# Patient Record
Sex: MALE | Race: ASIAN | NOT HISPANIC OR LATINO | Employment: FULL TIME | URBAN - METROPOLITAN AREA
[De-identification: names, ages, dates, MRNs, and addresses within clinical notes are randomized per-mention and may not be internally consistent; named-entity substitution may affect disease eponyms.]

---

## 2020-10-22 ENCOUNTER — AMBULATORY - HEALTHEAST (OUTPATIENT)
Dept: NURSING | Facility: CLINIC | Age: 30
End: 2020-10-22

## 2021-06-01 ENCOUNTER — RECORDS - HEALTHEAST (OUTPATIENT)
Dept: ADMINISTRATIVE | Facility: CLINIC | Age: 31
End: 2021-06-01

## 2022-05-22 ENCOUNTER — APPOINTMENT (OUTPATIENT)
Dept: GENERAL RADIOLOGY | Facility: CLINIC | Age: 32
End: 2022-05-22
Attending: PHYSICIAN ASSISTANT
Payer: COMMERCIAL

## 2022-05-22 ENCOUNTER — HOSPITAL ENCOUNTER (EMERGENCY)
Facility: CLINIC | Age: 32
Discharge: HOME | End: 2022-05-22
Attending: PHYSICIAN ASSISTANT | Admitting: PHYSICIAN ASSISTANT
Payer: COMMERCIAL

## 2022-05-22 VITALS
WEIGHT: 200 LBS | TEMPERATURE: 97.9 F | HEART RATE: 77 BPM | OXYGEN SATURATION: 95 % | DIASTOLIC BLOOD PRESSURE: 66 MMHG | SYSTOLIC BLOOD PRESSURE: 124 MMHG | RESPIRATION RATE: 20 BRPM | BODY MASS INDEX: 30.31 KG/M2 | HEIGHT: 68 IN

## 2022-05-22 DIAGNOSIS — M25.562 LEFT KNEE PAIN: ICD-10-CM

## 2022-05-22 DIAGNOSIS — S89.92XA KNEE INJURY, LEFT, INITIAL ENCOUNTER: ICD-10-CM

## 2022-05-22 PROCEDURE — 99203 OFFICE O/P NEW LOW 30 MIN: CPT | Performed by: PHYSICIAN ASSISTANT

## 2022-05-22 PROCEDURE — 73560 X-RAY EXAM OF KNEE 1 OR 2: CPT | Mod: LT

## 2022-05-22 PROCEDURE — G0463 HOSPITAL OUTPT CLINIC VISIT: HCPCS | Performed by: PHYSICIAN ASSISTANT

## 2022-05-22 NOTE — ED PROVIDER NOTES
"  History     Chief Complaint   Patient presents with     Fall     Patient was playing soccer yesterday and collided with another person, Swelling and pain today unable to bear full weight     HPI  Lizzeth Pitt is a 32 year old male who presents with complaints of left knee pain and swelling since yesterday.  Patient states he was playing soccer when he accidentally collided with another person.  He states they were both kicking the ball at the same time and patient's knee was hit and medially.  He has had medial knee pain since that time.  Patient describes difficulties weightbearing due to the pain.  He denies a sensation of his knee giving out on him.  No other injuries reported.      Allergies:  No Known Allergies    Problem List:    There are no problems to display for this patient.       Past Medical History:    History reviewed. No pertinent past medical history.    Past Surgical History:    History reviewed. No pertinent surgical history.    Family History:    History reviewed. No pertinent family history.    Social History:  Marital Status:  Single [1]  Social History     Tobacco Use     Smoking status: Never Smoker     Smokeless tobacco: Never Used        Medications:    No current outpatient medications on file.        Review of Systems   Gastrointestinal:        Left knee pain   Skin: Negative.    All other systems reviewed and are negative.      Physical Exam   BP: 124/66  Pulse: 77  Temp: 97.9  F (36.6  C)  Resp: 20  Height: 172.7 cm (5' 8\")  Weight: 90.7 kg (200 lb)  SpO2: 95 %      Physical Exam  Constitutional:       General: He is not in acute distress.     Appearance: He is well-developed. He is not ill-appearing, toxic-appearing or diaphoretic.   HENT:      Head: Normocephalic and atraumatic.   Eyes:      Conjunctiva/sclera: Conjunctivae normal.   Cardiovascular:      Pulses: Normal pulses.   Pulmonary:      Effort: Pulmonary effort is normal.   Musculoskeletal:      Cervical back: Neck supple.      " Left upper leg: Normal.      Left knee: No swelling or crepitus. Decreased range of motion. Tenderness present over the medial joint line and MCL. Normal alignment, normal meniscus and normal patellar mobility.      Left lower leg: Normal.      Left ankle: Normal.   Skin:     General: Skin is warm and dry.      Capillary Refill: Capillary refill takes less than 2 seconds.   Neurological:      General: No focal deficit present.      Mental Status: He is alert.      Sensory: Sensation is intact.      Motor: Motor function is intact.   Psychiatric:         Mood and Affect: Mood normal.         ED Course                 Procedures    Results for orders placed or performed during the hospital encounter of 05/22/22 (from the past 24 hour(s))   XR Knee Left 1/2 Views    Narrative    EXAM: XR KNEE LT 1/2 VW  LOCATION: [facility]  DATE/TIME: 5/22/2022 1:19 PM    INDICATION: soccer injury, tenderness to medial joint space  COMPARISON: None.      Impression    IMPRESSION: Normal joint spaces and alignment. No fracture or joint effusion.       Medications - No data to display    Assessments & Plan (with Medical Decision Making)     Pt is a 32 year old male who presents with complaints of left knee pain and swelling since yesterday.  Patient states he was playing soccer when he accidentally collided with another person.  He states they were both kicking the ball at the same time and patient's knee was hit and medially.  He has had medial knee pain since that time.  Patient describes difficulties weightbearing due to the pain.  He denies a sensation of his knee giving out on him.     Pt is afebrile on arrival.  Exam as above.  X-rays of left knee were negative for joint effusion, fracture, or malalignment.  Discussed results with patient.  We discussed possibility of meniscal or ligamentous injury.  Encouraged symptomatic treatments at home.  Return precautions were reviewed.  Hand-outs were  provided.    Patient was instructed to follow-up with orthopedics for continued care and management or sooner if new or worsening symptoms.  She is to return to the ED for persistent and/or worsening symptoms.  Patient expressed understanding of the diagnosis and plan and was discharged home in good condition.    I have reviewed the nursing notes.    I have reviewed the findings, diagnosis, plan and need for follow up with the patient.    There are no discharge medications for this patient.      Final diagnoses:   Left knee pain   Knee injury, left, initial encounter       5/22/2022   [facility] EMERGENCY DEPT      Disclaimer:  This note consists of symbols derived from keyboarding, dictation and/or voice recognition software.  As a result, there may be errors in the script that have gone undetected.  Please consider this when interpreting information found in this chart.     Josselyn Lozano PA-C  05/23/22 1059

## 2022-05-22 NOTE — ED TRIAGE NOTES
Patient was playing soccer yesterday and collided with another person, Swelling and pain today unable to bear full weight     Triage Assessment     Row Name 05/22/22 1217       Triage Assessment (Adult)    Airway WDL WDL       Respiratory WDL    Respiratory WDL WDL       Skin Circulation/Temperature WDL    Skin Circulation/Temperature WDL WDL       Cardiac WDL    Cardiac WDL WDL       Peripheral/Neurovascular WDL    Peripheral Neurovascular WDL WDL       Cognitive/Neuro/Behavioral WDL    Cognitive/Neuro/Behavioral WDL WDL

## 2022-05-23 ASSESSMENT — ENCOUNTER SYMPTOMS: ROS GI COMMENTS: LEFT KNEE PAIN

## 2023-07-09 ENCOUNTER — OFFICE VISIT (OUTPATIENT)
Dept: FAMILY MEDICINE | Facility: CLINIC | Age: 33
End: 2023-07-09
Payer: COMMERCIAL

## 2023-07-09 VITALS
WEIGHT: 211.4 LBS | HEART RATE: 76 BPM | RESPIRATION RATE: 18 BRPM | OXYGEN SATURATION: 97 % | BODY MASS INDEX: 32.14 KG/M2 | SYSTOLIC BLOOD PRESSURE: 130 MMHG | DIASTOLIC BLOOD PRESSURE: 80 MMHG

## 2023-07-09 DIAGNOSIS — S61.310A LACERATION OF RIGHT INDEX FINGER WITHOUT FOREIGN BODY WITH DAMAGE TO NAIL, INITIAL ENCOUNTER: Primary | ICD-10-CM

## 2023-07-09 PROCEDURE — 99203 OFFICE O/P NEW LOW 30 MIN: CPT

## 2023-07-09 RX ORDER — BACITRACIN ZINC 500 [USP'U]/G
OINTMENT TOPICAL 3 TIMES DAILY
Qty: 28.4 G | Refills: 0 | Status: SHIPPED | OUTPATIENT
Start: 2023-07-09

## 2023-07-09 NOTE — PATIENT INSTRUCTIONS
We discussed the following at this visit:    fingertip cut.  Like I discussed I do not believe that your laceration needs any sutures at this time.  It should heal with good cleaning and wound treatment which I have provided you with information with today.  Please use Tylenol as needed for pain.  Your immunizations are up-to-date.  Please follow-up with your primary care provider should you have any issues with wound healing.    Sadi Ambriz, DO

## 2023-07-09 NOTE — LETTER
[address] [address]  Phone: [phone]  Fax: [phone]    July 9, 2023        Lizzeth Pitt  [address] [facility] [place] [address]          To whom it may concern:    RE: Lizzeth Pitt    Patient was seen and treated today at our clinic.  He may return to work but I would like him to not lift more than 10 lbs for 1 week .     Please contact me for questions or concerns.      Sincerely,        Sadi Ambriz, DO

## 2023-07-09 NOTE — PROGRESS NOTES
Assessment & Plan     Laceration of right index finger without foreign body with damage to nail, initial encounter  Presenting 1 hour after cutting his his finger on a knife after cutting lemon grass.  On examination it cleanly cut off very superficial layer of his finger and including the nail with no injury to the cuticle. Wound was irrigated with saline.  Wound was explored for any foreign bodies and evaluated for tendon, nerve, vessel or joint involvement.  No closure was needed on examination. Bandage was applied.  Discussed wound healing and recommended follow-up should any symptoms arise.  Tetanus up-to-date.  - bacitracin 500 UNIT/GM external ointment  Dispense: 28.4 g; Refill: 0    Return if symptoms worsen or fail to improve.    Sadi Ambriz [facility] MIGUEL A Moreau is a 33 year old male who presents to clinic today for the following health issues:  Chief Complaint   Patient presents with     Laceration     Cut L 3rd digit while cutting lemongrass     HPI  Laceration    Mechanism of injury: Knife cut  History provided by: Patient  Time of injury was 1 hours(s) ago.    This is a non-work related injury.    Associated symptoms: Denies numbness, weakness, or loss of function    Last tetanus booster within 10 years: Yes    LACERATION EXAM:   Size of laceration: 2 centimeters  Characteristics of the laceration: clean and linear  Depth of laceration: superficial  Tendon function intact: Yes  Sensation to light touch intact: Yes  Pulses/capillary refill intact: Yes  Foreign body: No    Cut occurred while cutting lemon grass.  Knife was previously clean and took off the front posterior aspect of his left pointer finger.    Review of Systems  Constitutional, HEENT, cardiovascular, pulmonary, gi and gu systems are negative, except as otherwise noted.      Objective    /80   Pulse 76   Resp 18   Wt 95.9 kg (211 lb 6.4 oz)   SpO2 97%   BMI 32.14 kg/m    Physical Exam    GENERAL: healthy, alert and no distress  NECK: no adenopathy, no asymmetry, masses, or scars and thyroid normal to palpation  RESP: lungs clear to auscultation - no rales, rhonchi or wheezes  CV: regular rate and rhythm, normal S1 S2, no S3 or S4, no murmur, click or rub, no peripheral edema and peripheral pulses strong  ABDOMEN: soft, nontender, no hepatosplenomegaly, no masses and bowel sounds normal  MS: no gross musculoskeletal defects noted, no edema  SKIN: 1.5 x 0.5 centimeter abrasion to right pointer finger through half of the nail but not affecting the cuticle.  Superficial laceration.  Intact sensation, intact movement.  No longer bleeding.